# Patient Record
Sex: FEMALE
[De-identification: names, ages, dates, MRNs, and addresses within clinical notes are randomized per-mention and may not be internally consistent; named-entity substitution may affect disease eponyms.]

---

## 2023-07-19 ENCOUNTER — NURSE TRIAGE (OUTPATIENT)
Dept: OTHER | Facility: CLINIC | Age: 38
End: 2023-07-19

## 2023-07-19 NOTE — TELEPHONE ENCOUNTER
Location of patient: [place]    Subjective: Caller states \"I started getting sick Thursday morning started with nausea, clammy, sweaty, then Friday scratchy throat/body aches, Cough and congestion Sunday. Monday I went to [facility], prescribed steroids. \" COVID/Flu A&B neg    Current Symptoms:   +Productive Cough, yellow  +HA/neck pain endorses full ROM  Denies fever, chest pain, difficulty breathing, N/V/D    Onset: several days ago;     Pain Severity: 4/10; Temperature: Denies feeling feverish    What has been tried:     Recommended disposition: See PCP within 24 Hours    OUTCOME: Care advice and recommended disposition provided, patient verbalizes understanding; denies any other questions or concerns; instructed to call back for any new or worsening symptoms. This triage is a result of a call to [address] [place] [address]. Please do not respond to the triage nurse through this encounter. Any subsequent communication should be directly with the patient.       Reason for Disposition   SEVERE coughing spells (e.g., whooping sound after coughing, vomiting after coughing)    Protocols used: Cough - Acute Productive-ADULT-AH